# Patient Record
Sex: MALE | Race: WHITE | NOT HISPANIC OR LATINO | Employment: FULL TIME | URBAN - METROPOLITAN AREA
[De-identification: names, ages, dates, MRNs, and addresses within clinical notes are randomized per-mention and may not be internally consistent; named-entity substitution may affect disease eponyms.]

---

## 2022-11-24 ENCOUNTER — OFFICE VISIT (OUTPATIENT)
Dept: URGENT CARE | Facility: URGENT CARE | Age: 22
End: 2022-11-24
Payer: COMMERCIAL

## 2022-11-24 VITALS
DIASTOLIC BLOOD PRESSURE: 79 MMHG | OXYGEN SATURATION: 97 % | SYSTOLIC BLOOD PRESSURE: 125 MMHG | TEMPERATURE: 100.3 F | WEIGHT: 192.3 LBS | HEART RATE: 102 BPM

## 2022-11-24 DIAGNOSIS — J10.1 INFLUENZA A: Primary | ICD-10-CM

## 2022-11-24 LAB
DEPRECATED S PYO AG THROAT QL EIA: NEGATIVE
FLUAV AG SPEC QL IA: POSITIVE
FLUBV AG SPEC QL IA: NEGATIVE

## 2022-11-24 PROCEDURE — U0003 INFECTIOUS AGENT DETECTION BY NUCLEIC ACID (DNA OR RNA); SEVERE ACUTE RESPIRATORY SYNDROME CORONAVIRUS 2 (SARS-COV-2) (CORONAVIRUS DISEASE [COVID-19]), AMPLIFIED PROBE TECHNIQUE, MAKING USE OF HIGH THROUGHPUT TECHNOLOGIES AS DESCRIBED BY CMS-2020-01-R: HCPCS | Performed by: PHYSICIAN ASSISTANT

## 2022-11-24 PROCEDURE — 87651 STREP A DNA AMP PROBE: CPT | Performed by: PHYSICIAN ASSISTANT

## 2022-11-24 PROCEDURE — 87804 INFLUENZA ASSAY W/OPTIC: CPT | Performed by: PHYSICIAN ASSISTANT

## 2022-11-24 PROCEDURE — U0005 INFEC AGEN DETEC AMPLI PROBE: HCPCS | Performed by: PHYSICIAN ASSISTANT

## 2022-11-24 PROCEDURE — 99204 OFFICE O/P NEW MOD 45 MIN: CPT | Mod: CS | Performed by: PHYSICIAN ASSISTANT

## 2022-11-24 RX ORDER — LEVOTHYROXINE SODIUM 175 UG/1
TABLET ORAL
COMMUNITY
Start: 2021-11-22

## 2022-11-24 RX ORDER — INSULIN ASPART 100 [IU]/ML
INJECTION, SOLUTION INTRAVENOUS; SUBCUTANEOUS
COMMUNITY
Start: 2022-03-14

## 2022-11-24 RX ORDER — INSULIN GLARGINE 100 [IU]/ML
INJECTION, SOLUTION SUBCUTANEOUS
COMMUNITY
Start: 2022-03-30

## 2022-11-24 NOTE — PROGRESS NOTES
Assessment & Plan     1. Influenza A  On exam, lungs are CTAB without sign of respiratory distress. Throat without PTA or RPA and TM clear B/L. No nuchal rigidity or abd tenderness.    Suspect viral illness, and influenza A is positive  Out of the treatment window for tamiflu, and patient reports that his symptoms are improving.   Discussed pushing fluids and insulin, also signs of DKA, not currently having any of those symptoms  Discussed indications for follow-up   - Influenza A & B Antigen - Clinic Collect  - Streptococcus A Rapid Screen w/Reflex to PCR  - Symptomatic; Unknown COVID-19 Virus (Coronavirus) by PCR Nose  - Group A Streptococcus PCR Throat Swab      Return in about 3 days (around 11/27/2022), or if symptoms worsen or fail to improve.    Diagnosis and treatment plan was reviewed with patient and/or family.   We went over any labs or imaging. Discussed worsening symptoms or little to no relief despite treatment plan to follow-up with PCP or return to clinic.  Patient verbalizes understanding. All questions were addressed and answered.     Lita Craig, HECTOR  [facility] [place]    CHIEF COMPLAINT:   Chief Complaint   Patient presents with     URI     Start day 4 sx dry cough, chills, bed sweats, felt feverish hx COVID vaccinated tx cold medicine- naila selzer, Mucinex, cough drops NEEDS NOTE FOR WORK      Subjective     Nathaniel is a 22 year old male who presents to clinic today for evaluation of URI symptoms. Started 3-4 days ago with chills, cough, fever, nasal congestion. He has been having to use more insulin than he typically would at baseline.   He has been using Mucinex, cough drops, naila seltzer etc  No abd pain, nausea, vomiting, diarrhea, dysuria or hematuria.       History reviewed. No pertinent past medical history.  History reviewed. No pertinent surgical history.  Social History     Tobacco Use     Smoking status: Never     Smokeless tobacco: Never   Substance Use  Topics     Alcohol use: Not on file     Current Outpatient Medications   Medication     Glucagon 1 MG/0.2ML SOAJ     insulin aspart (NOVOLOG VIAL) 100 UNITS/ML vial     insulin glargine (BASAGLAR KWIKPEN) 100 UNIT/ML pen     levothyroxine (SYNTHROID/LEVOTHROID) 175 MCG tablet     No current facility-administered medications for this visit.     No Known Allergies    10 point ROS of systems were all negative except for pertinent positives noted in my HPI.      Exam:   /79   Pulse 102   Temp 100.3  F (37.9  C)   Wt 87.2 kg (192 lb 4.8 oz)   SpO2 97%   Constitutional: healthy, alert and no distress  Head: Normocephalic, atraumatic.  Eyes: conjunctiva clear, no drainage  ENT: TMs clear and shiny devon, nasal mucosa pink and moist, throat is erythematous without trismus or drooling.   Neck: neck is supple, no cervical lymphadenopathy or nuchal rigidity  Cardiovascular: RRR  Respiratory: CTA bilaterally, no rhonchi or rales  Gastrointestinal: soft and nontender. No rebound, guarding or rigidity  Skin: no rashes  Neurologic: Speech clear, gait normal. Moves all extremities.    Results for orders placed or performed in visit on 11/24/22   Influenza A & B Antigen - Clinic Collect     Status: Abnormal    Specimen: Nose; Swab   Result Value Ref Range    Influenza A antigen Positive (A) Negative    Influenza B antigen Negative Negative    Narrative    Test results must be correlated with clinical data. If necessary, results should be confirmed by a molecular assay or viral culture.   Streptococcus A Rapid Screen w/Reflex to PCR     Status: Normal    Specimen: Throat; Swab   Result Value Ref Range    Group A Strep antigen Negative Negative

## 2022-11-24 NOTE — LETTER
[facility] [place]  [address]  [address]  [place] [address]  Phone: [phone]  Fax: [phone]    November 24, 2022        Nathaniel eFrreira  [facility] [address] [place] [address]          To whom it may concern:    RE: Nathaniel Ferreira    Patient was seen and treated today at our clinic. Please excuse from work 11/21/2022 - 11/27/2022. Patient may return to work Monday as long as symptoms are improving.     Please contact me for questions or concerns.      Sincerely,        Lita Craig PA-C

## 2022-11-25 LAB
GROUP A STREP BY PCR: NOT DETECTED
SARS-COV-2 RNA RESP QL NAA+PROBE: NEGATIVE

## 2024-04-22 ENCOUNTER — OFFICE VISIT (OUTPATIENT)
Dept: URGENT CARE | Facility: URGENT CARE | Age: 24
End: 2024-04-22
Payer: COMMERCIAL

## 2024-04-22 VITALS
DIASTOLIC BLOOD PRESSURE: 78 MMHG | OXYGEN SATURATION: 98 % | RESPIRATION RATE: 20 BRPM | SYSTOLIC BLOOD PRESSURE: 136 MMHG | TEMPERATURE: 98 F | HEART RATE: 102 BPM

## 2024-04-22 DIAGNOSIS — R11.0 NAUSEA: ICD-10-CM

## 2024-04-22 DIAGNOSIS — R07.9 ACUTE CHEST PAIN: ICD-10-CM

## 2024-04-22 DIAGNOSIS — R50.9 FEVER, UNSPECIFIED FEVER CAUSE: Primary | ICD-10-CM

## 2024-04-22 DIAGNOSIS — D72.820 LYMPHOCYTOSIS: ICD-10-CM

## 2024-04-22 DIAGNOSIS — J02.9 SORE THROAT: ICD-10-CM

## 2024-04-22 PROBLEM — Z96.41 INSULIN PUMP STATUS: Status: ACTIVE | Noted: 2019-03-11

## 2024-04-22 LAB
ALBUMIN UR-MCNC: NEGATIVE MG/DL
APPEARANCE UR: CLEAR
BACTERIA #/AREA URNS HPF: NORMAL /HPF
BASOPHILS # BLD AUTO: ABNORMAL 10*3/UL
BASOPHILS # BLD MANUAL: 0 10E3/UL (ref 0–0.2)
BASOPHILS NFR BLD AUTO: ABNORMAL %
BASOPHILS NFR BLD MANUAL: 0 %
BILIRUB UR QL STRIP: NEGATIVE
COLOR UR AUTO: YELLOW
DEPRECATED S PYO AG THROAT QL EIA: NEGATIVE
EOSINOPHIL # BLD AUTO: ABNORMAL 10*3/UL
EOSINOPHIL # BLD MANUAL: 0.1 10E3/UL (ref 0–0.7)
EOSINOPHIL NFR BLD AUTO: ABNORMAL %
EOSINOPHIL NFR BLD MANUAL: 1 %
ERYTHROCYTE [DISTWIDTH] IN BLOOD BY AUTOMATED COUNT: 15.6 % (ref 10–15)
GLUCOSE UR STRIP-MCNC: 100 MG/DL
GROUP A STREP BY PCR: NOT DETECTED
HCT VFR BLD AUTO: 39.5 % (ref 40–53)
HGB BLD-MCNC: 12.9 G/DL (ref 13.3–17.7)
HGB UR QL STRIP: NEGATIVE
IMM GRANULOCYTES # BLD: ABNORMAL 10*3/UL
IMM GRANULOCYTES NFR BLD: ABNORMAL %
KETONES UR STRIP-MCNC: NEGATIVE MG/DL
LEUKOCYTE ESTERASE UR QL STRIP: NEGATIVE
LYMPHOCYTES # BLD AUTO: ABNORMAL 10*3/UL
LYMPHOCYTES # BLD MANUAL: 4.1 10E3/UL (ref 0.8–5.3)
LYMPHOCYTES NFR BLD AUTO: ABNORMAL %
LYMPHOCYTES NFR BLD MANUAL: 64 %
MCH RBC QN AUTO: 29.9 PG (ref 26.5–33)
MCHC RBC AUTO-ENTMCNC: 32.7 G/DL (ref 31.5–36.5)
MCV RBC AUTO: 91 FL (ref 78–100)
MONOCYTES # BLD AUTO: ABNORMAL 10*3/UL
MONOCYTES # BLD MANUAL: 0.5 10E3/UL (ref 0–1.3)
MONOCYTES NFR BLD AUTO: ABNORMAL %
MONOCYTES NFR BLD AUTO: NEGATIVE %
MONOCYTES NFR BLD MANUAL: 8 %
NEUTROPHILS # BLD AUTO: ABNORMAL 10*3/UL
NEUTROPHILS # BLD MANUAL: 1.7 10E3/UL (ref 1.6–8.3)
NEUTROPHILS NFR BLD AUTO: ABNORMAL %
NEUTROPHILS NFR BLD MANUAL: 27 %
NITRATE UR QL: NEGATIVE
NRBC # BLD AUTO: 0 10E3/UL
NRBC BLD AUTO-RTO: 0 /100
PH UR STRIP: 6 [PH] (ref 5–7)
PLAT MORPH BLD: ABNORMAL
PLATELET # BLD AUTO: 262 10E3/UL (ref 150–450)
RBC # BLD AUTO: 4.32 10E6/UL (ref 4.4–5.9)
RBC #/AREA URNS AUTO: NORMAL /HPF
RBC MORPH BLD: ABNORMAL
SP GR UR STRIP: >=1.03 (ref 1–1.03)
SQUAMOUS #/AREA URNS AUTO: NORMAL /LPF
UROBILINOGEN UR STRIP-ACNC: 4 E.U./DL
VARIANT LYMPHS BLD QL SMEAR: PRESENT
WBC # BLD AUTO: 6.4 10E3/UL (ref 4–11)
WBC #/AREA URNS AUTO: NORMAL /HPF

## 2024-04-22 PROCEDURE — 87651 STREP A DNA AMP PROBE: CPT | Performed by: FAMILY MEDICINE

## 2024-04-22 PROCEDURE — 99213 OFFICE O/P EST LOW 20 MIN: CPT | Mod: 25 | Performed by: FAMILY MEDICINE

## 2024-04-22 PROCEDURE — 86308 HETEROPHILE ANTIBODY SCREEN: CPT | Performed by: FAMILY MEDICINE

## 2024-04-22 PROCEDURE — 86706 HEP B SURFACE ANTIBODY: CPT | Performed by: FAMILY MEDICINE

## 2024-04-22 PROCEDURE — 80053 COMPREHEN METABOLIC PANEL: CPT | Performed by: FAMILY MEDICINE

## 2024-04-22 PROCEDURE — 86803 HEPATITIS C AB TEST: CPT | Performed by: FAMILY MEDICINE

## 2024-04-22 PROCEDURE — 93000 ELECTROCARDIOGRAM COMPLETE: CPT | Performed by: FAMILY MEDICINE

## 2024-04-22 PROCEDURE — 87340 HEPATITIS B SURFACE AG IA: CPT | Performed by: FAMILY MEDICINE

## 2024-04-22 PROCEDURE — 85027 COMPLETE CBC AUTOMATED: CPT | Performed by: FAMILY MEDICINE

## 2024-04-22 PROCEDURE — 36415 COLL VENOUS BLD VENIPUNCTURE: CPT | Performed by: FAMILY MEDICINE

## 2024-04-22 PROCEDURE — 86780 TREPONEMA PALLIDUM: CPT | Performed by: FAMILY MEDICINE

## 2024-04-22 PROCEDURE — 81001 URINALYSIS AUTO W/SCOPE: CPT | Performed by: FAMILY MEDICINE

## 2024-04-22 PROCEDURE — 87389 HIV-1 AG W/HIV-1&-2 AB AG IA: CPT | Performed by: FAMILY MEDICINE

## 2024-04-22 PROCEDURE — 85007 BL SMEAR W/DIFF WBC COUNT: CPT | Performed by: FAMILY MEDICINE

## 2024-04-22 NOTE — PROGRESS NOTES
There are no exam notes on file for this visit.  Nursing note reviewed with patient.  Accurracy and completeness verified.    Chief Complaint   Patient presents with    Headache     Headache/dizzy/nausea/chest pain since yesterday        HPI:  Fevers, every other right, headache, palpitations  Chest pain and cold at night.      Blood sugars well controlled,   Dexcom says things are fine    No change with thyroid meds.      ROS: As per HPI.  10 point ROS of systems including Constitutional, Eyes, Respiratory, Cardiovascular, Gastroenterology, Genitourinary, Integumentary, Muscularskeletal, Psychiatric were all negative except for pertinent positives noted in my HPI.    I have reviewed and updated the patient's past medical history in the EMR. Current problems are:  Patient Active Problem List   Diagnosis    Diabetes mellitus type 1 (H)    Insulin pump status       Allergies, reviewed:   No Known Allergies    Current Outpatient Medications   Medication Sig Dispense Refill    Glucagon 1 MG/0.2ML SOAJ Inject 1 mg Subcutaneous      insulin aspart (NOVOLOG VIAL) 100 UNITS/ML vial 50-60 units per day via insulin pump.      insulin glargine (BASAGLAR KWIKPEN) 100 UNIT/ML pen INJECT 22 UNITS UNDER THE SKIN ONCE DAILY IF PUMP FAILS      levothyroxine (SYNTHROID/LEVOTHROID) 175 MCG tablet Take 2 tablets daily five days of the week and 1 tablet two days of the week         Objective:  /78   Pulse 102   Temp 98  F (36.7  C) (Tympanic)   Resp 20   SpO2 98%     General Appearance: Pleasant, alert, WN/WD in no acute respiratory distress.  Chest/Respiratory Exam: Normal, comfortable, easy respirations. Chest wall normal. Lungs are clear to auscultation.  Cardiovascular Exam: Regular rate and rhythm. No murmur, gallop, or rubs. No pedal edema.  Skin: no rash, warm and dry.  Dry hands (this is baseline)  Neurologic Exam: Nonfocal, no tremor. Normal gait.  Psychiatric Exam: Alert - appropriate, normal affect    Symptoms at  time of EKG: None   Rhythm: Normal sinus   Rate: Normal  Axis: Normal  Ectopy: None  Conduction: Normal  ST Segments/ T Waves: No ST-T wave changes and No acute ischemic changes  Q Waves: None  Comparison to prior: No old EKG available    Clinical Impression: normal EKG    Results for orders placed or performed in visit on 04/22/24   UA with Microscopic reflex to Culture - lab collect     Status: Abnormal    Specimen: Urine, Clean Catch   Result Value Ref Range    Color Urine Yellow Colorless, Straw, Light Yellow, Yellow    Appearance Urine Clear Clear    Glucose Urine 100 (A) Negative mg/dL    Bilirubin Urine Negative Negative    Ketones Urine Negative Negative mg/dL    Specific Gravity Urine >=1.030 1.003 - 1.035    Blood Urine Negative Negative    pH Urine 6.0 5.0 - 7.0    Protein Albumin Urine Negative Negative mg/dL    Urobilinogen Urine 4.0 (A) 0.2, 1.0 E.U./dL    Nitrite Urine Negative Negative    Leukocyte Esterase Urine Negative Negative   UA Microscopic with Reflex to Culture     Status: Normal   Result Value Ref Range    Bacteria Urine None Seen None Seen /HPF    RBC Urine 0-2 0-2 /HPF /HPF    WBC Urine 0-5 0-5 /HPF /HPF    Squamous Epithelials Urine None Seen None Seen /LPF    Narrative    Urine Culture not indicated   Mononucleosis screen     Status: Normal   Result Value Ref Range    Mononucleosis Screen Negative Negative   Streptococcus A Rapid Screen w/Reflex to PCR - Clinic Collect     Status: Normal    Specimen: Throat; Swab   Result Value Ref Range    Group A Strep antigen Negative Negative   CBC with platelets and differential     Status: None (In process)    Narrative    The following orders were created for panel order CBC with platelets and differential.  Procedure                               Abnormality         Status                     ---------                               -----------         ------                     CBC with platelets and d...[[id number]]                      In  process                   Please view results for these tests on the individual orders.     Results for orders placed or performed in visit on 04/22/24   UA with Microscopic reflex to Culture - lab collect     Status: Abnormal    Specimen: Urine, Clean Catch   Result Value Ref Range    Color Urine Yellow Colorless, Straw, Light Yellow, Yellow    Appearance Urine Clear Clear    Glucose Urine 100 (A) Negative mg/dL    Bilirubin Urine Negative Negative    Ketones Urine Negative Negative mg/dL    Specific Gravity Urine >=1.030 1.003 - 1.035    Blood Urine Negative Negative    pH Urine 6.0 5.0 - 7.0    Protein Albumin Urine Negative Negative mg/dL    Urobilinogen Urine 4.0 (A) 0.2, 1.0 E.U./dL    Nitrite Urine Negative Negative    Leukocyte Esterase Urine Negative Negative   UA Microscopic with Reflex to Culture     Status: Normal   Result Value Ref Range    Bacteria Urine None Seen None Seen /HPF    RBC Urine 0-2 0-2 /HPF /HPF    WBC Urine 0-5 0-5 /HPF /HPF    Squamous Epithelials Urine None Seen None Seen /LPF    Narrative    Urine Culture not indicated   Mononucleosis screen     Status: Normal   Result Value Ref Range    Mononucleosis Screen Negative Negative   Streptococcus A Rapid Screen w/Reflex to PCR - Clinic Collect     Status: Normal    Specimen: Throat; Swab   Result Value Ref Range    Group A Strep antigen Negative Negative   CBC with platelets and differential     Status: None (In process)    Narrative    The following orders were created for panel order CBC with platelets and differential.  Procedure                               Abnormality         Status                     ---------                               -----------         ------                     CBC with platelets and d...[[id number]]                      In process                   Please view results for these tests on the individual orders.       CBC which is a send out has white blood count 6.2  73% lymphs and 20% neutrophils   atypical  lymphocytes seen    ASSESSMENT/PLAN:    ICD-10-CM    1. Fever, unspecified fever cause  R50.9 Mononucleosis screen     Mononucleosis screen     Hepatitis C antibody     HIV Antigen Antibody Combo     Treponema Abs w Reflex to RPR and Titer     Hepatitis B surface antigen     Hepatitis B Surface Antibody     Hepatitis C antibody     HIV Antigen Antibody Combo     Treponema Abs w Reflex to RPR and Titer     Hepatitis B surface antigen     Hepatitis B Surface Antibody      2. Acute chest pain  R07.9 EKG 12-lead complete w/read - Clinics     Streptococcus A Rapid Screen w/Reflex to PCR - Clinic Collect     CBC with platelets and differential     UA with Microscopic reflex to Culture - lab collect     Comprehensive metabolic panel (BMP + Alb, Alk Phos, ALT, AST, Total. Bili, TP)     CBC with platelets and differential     UA with Microscopic reflex to Culture - lab collect     Comprehensive metabolic panel (BMP + Alb, Alk Phos, ALT, AST, Total. Bili, TP)     Group A Streptococcus PCR Throat Swab     UA Microscopic with Reflex to Culture      3. Sore throat  J02.9 Streptococcus A Rapid Screen w/Reflex to PCR - Clinic Collect     CBC with platelets and differential     UA with Microscopic reflex to Culture - lab collect     Comprehensive metabolic panel (BMP + Alb, Alk Phos, ALT, AST, Total. Bili, TP)     CBC with platelets and differential     UA with Microscopic reflex to Culture - lab collect     Comprehensive metabolic panel (BMP + Alb, Alk Phos, ALT, AST, Total. Bili, TP)     Group A Streptococcus PCR Throat Swab     UA Microscopic with Reflex to Culture     Mononucleosis screen     Mononucleosis screen     Hepatitis C antibody     HIV Antigen Antibody Combo     Treponema Abs w Reflex to RPR and Titer     Hepatitis B surface antigen     Hepatitis B Surface Antibody     Hepatitis C antibody     HIV Antigen Antibody Combo     Treponema Abs w Reflex to RPR and Titer     Hepatitis B surface antigen     Hepatitis B Surface  Antibody      4. Nausea  R11.0 Streptococcus A Rapid Screen w/Reflex to PCR - Clinic Collect     CBC with platelets and differential     UA with Microscopic reflex to Culture - lab collect     Comprehensive metabolic panel (BMP + Alb, Alk Phos, ALT, AST, Total. Bili, TP)     CBC with platelets and differential     UA with Microscopic reflex to Culture - lab collect     Comprehensive metabolic panel (BMP + Alb, Alk Phos, ALT, AST, Total. Bili, TP)     Group A Streptococcus PCR Throat Swab     UA Microscopic with Reflex to Culture     Hepatitis C antibody     HIV Antigen Antibody Combo     Treponema Abs w Reflex to RPR and Titer     Hepatitis B surface antigen     Hepatitis B Surface Antibody     Hepatitis C antibody     HIV Antigen Antibody Combo     Treponema Abs w Reflex to RPR and Titer     Hepatitis B surface antigen     Hepatitis B Surface Antibody      5. Lymphocytosis  D72.820 Hepatitis C antibody     HIV Antigen Antibody Combo     Treponema Abs w Reflex to RPR and Titer     Hepatitis B surface antigen     Hepatitis B Surface Antibody     Hepatitis C antibody     HIV Antigen Antibody Combo     Treponema Abs w Reflex to RPR and Titer     Hepatitis B surface antigen     Hepatitis B Surface Antibody        Constellation of symptoms including fever sore throat nausea  Abnormal labs so far only lymphocytosis, atypical lymphs suggesting a viral infection  Monospot is negative but still could be mononucleosis  Also could be CMV mononucleosis    Upon obtaining additional history patient also relates having sex with a new male partner about 4 weeks ago, unprotected    Differential diagnosis then also includes sexually acquired viral infections such as hepatitis A, C and B , cmv (which only needs to be checked in certain circumstances), and HIV.  Treponemal test added on because of the unprotected sexual encounter      Corbin Guerrero MD MPH

## 2024-04-23 LAB
ALBUMIN SERPL BCG-MCNC: 4.3 G/DL (ref 3.5–5.2)
ALP SERPL-CCNC: 90 U/L (ref 40–150)
ALT SERPL W P-5'-P-CCNC: 97 U/L (ref 0–70)
ANION GAP SERPL CALCULATED.3IONS-SCNC: 8 MMOL/L (ref 7–15)
AST SERPL W P-5'-P-CCNC: 56 U/L (ref 0–45)
BILIRUB SERPL-MCNC: 1.5 MG/DL
BUN SERPL-MCNC: 14.8 MG/DL (ref 6–20)
CALCIUM SERPL-MCNC: 9.1 MG/DL (ref 8.6–10)
CHLORIDE SERPL-SCNC: 108 MMOL/L (ref 98–107)
CREAT SERPL-MCNC: 0.81 MG/DL (ref 0.67–1.17)
DEPRECATED HCO3 PLAS-SCNC: 27 MMOL/L (ref 22–29)
EGFRCR SERPLBLD CKD-EPI 2021: >90 ML/MIN/1.73M2
GLUCOSE SERPL-MCNC: 106 MG/DL (ref 70–99)
HBV SURFACE AB SERPL IA-ACNC: 16.4 M[IU]/ML
HBV SURFACE AB SERPL IA-ACNC: REACTIVE M[IU]/ML
HBV SURFACE AG SERPL QL IA: NONREACTIVE
HCV AB SERPL QL IA: NONREACTIVE
HIV 1+2 AB+HIV1 P24 AG SERPL QL IA: NONREACTIVE
POTASSIUM SERPL-SCNC: 4.7 MMOL/L (ref 3.4–5.3)
PROT SERPL-MCNC: 6.9 G/DL (ref 6.4–8.3)
SODIUM SERPL-SCNC: 143 MMOL/L (ref 135–145)
T PALLIDUM AB SER QL: NONREACTIVE

## 2024-05-19 ENCOUNTER — HEALTH MAINTENANCE LETTER (OUTPATIENT)
Age: 24
End: 2024-05-19

## 2024-10-06 ENCOUNTER — HEALTH MAINTENANCE LETTER (OUTPATIENT)
Age: 24
End: 2024-10-06

## 2025-01-19 ENCOUNTER — HEALTH MAINTENANCE LETTER (OUTPATIENT)
Age: 25
End: 2025-01-19

## 2025-04-27 ENCOUNTER — HEALTH MAINTENANCE LETTER (OUTPATIENT)
Age: 25
End: 2025-04-27

## 2025-06-08 ENCOUNTER — HEALTH MAINTENANCE LETTER (OUTPATIENT)
Age: 25
End: 2025-06-08

## 2025-08-10 ENCOUNTER — HEALTH MAINTENANCE LETTER (OUTPATIENT)
Age: 25
End: 2025-08-10